# Patient Record
(demographics unavailable — no encounter records)

---

## 2025-06-18 NOTE — HISTORY OF PRESENT ILLNESS
[de-identified] : This is a 65 year old postmenopausal woman diagnosed with breast cancer in sept 2018. She had a hx of prior lumpectomy for calcifications 12 years prior on the left. \par  Abnormal mammogram led to biopsy in right breast  in sept 2018 revealing for IDC, grade 1, er  95% , pr pos 30% her 2 neg\par  Surgery with dr nik ortiz at [place], nov 2018- 1 cm IDC, grade 1, one sentinal node negative. pT1bNo\par  \par  ONcotype was 22. \par  s/p RT until jan 2019\par  \par  started femar initially then aromasin and now on  arimidex with improved symptoms \par  \par  osteopenia in jan 2019 \par  hx of ovary cyst follows with dr house \par  colonoscopy in 2019 \par  also has hx of lyme s/p doxycycline \par  \par  \par  Ultrasound performed on January 23, 2021 revealing for a left parajugular lymph node measuring 1.3 x 1.5 x 0.6 cm no other abnormality seen.\par  Patient now discloses that she had several injections throughout her face and neck with a derma pen on January 6, 2021.\par  Repeat CT of the chest done on January 26, 2021 revealing for no change in the AP window lymph node measuring 2.3 x 1.3cm as well as another lymph measuring 1.7 x 1.3 and another measuring 2.2 x 1.1 precarinal lymph node measuring 1.9 x 0.9.  No change in the left upper lobe nodule measuring 3 mm.\par  \par  \par  mammo sept 2020 \par  \par  + node is now 0.6 x 0.3 cm , done by IR , now better, radiologist made the determination that she will not get biopsy\par   [de-identified] : Here today for follow up of breast cancer  She is feeling well  CNY: March 2024, had 2 polyps removed DEXA: August 2023 Mammo 11/2024-DONG

## 2025-06-18 NOTE — ASSESSMENT
[FreeTextEntry1] : This is a 68 y/o woman with hx of hormone therapy dx with IDC pT1bNo,  ER pos her2 negative , oncotype 22. S/p radiation. On arimidex 11/2018- 3/24  # breast cancer  doing well DONG  markers stable repeat today  Oct 2023 mammo stable  ultrasound of neck stable , exam stable, repeat again in april (due now but given recent covid will push out to april)  repeat ct chest to follow up also  Continue Arimidex, plan for at least 5 yrs  Patient with significant side effects from AI - pain, thinning of the hair, dryness,  Change to tamoxifen 20 mg - August 2022- March 2023, tamoxifen stopped, biopsy negative Feb 2023 Completed Arimidex Feb 2024  # thickened endometrium - s/p D&C benign- Due for TV US with gyn  # osteopenia -1.3 lumbar spine dexa August 2023 - low bone density, due now   cont vit d and exercise   # joint pains - much better since off AI  # vaginal dryness  stable  has hyaluronic acid suppositories  s/p follow up dr house will see PT for pelvic floor PT   # adenopathy Ultrasound of the neck shows a perijugular node stable on exam  stable on ultrasound  s/p ent eval, no bx recommended  ultrasound  May 2022 - DONG  Discussed with patient at length Follow-up in 12 months

## 2025-06-18 NOTE — PHYSICAL EXAM
[de-identified] : Left supraclavicular lymph node stable, small rubbery movable  [de-identified] : no masses or adenopathy bilaterally

## 2025-06-18 NOTE — PHYSICAL EXAM
[de-identified] : Left supraclavicular lymph node stable, small rubbery movable  [de-identified] : no masses or adenopathy bilaterally

## 2025-06-18 NOTE — HISTORY OF PRESENT ILLNESS
[de-identified] : This is a 65 year old postmenopausal woman diagnosed with breast cancer in sept 2018. She had a hx of prior lumpectomy for calcifications 12 years prior on the left. \par  Abnormal mammogram led to biopsy in right breast  in sept 2018 revealing for IDC, grade 1, er  95% , pr pos 30% her 2 neg\par  Surgery with dr nik ortiz at [place], nov 2018- 1 cm IDC, grade 1, one sentinal node negative. pT1bNo\par  \par  ONcotype was 22. \par  s/p RT until jan 2019\par  \par  started femar initially then aromasin and now on  arimidex with improved symptoms \par  \par  osteopenia in jan 2019 \par  hx of ovary cyst follows with dr house \par  colonoscopy in 2019 \par  also has hx of lyme s/p doxycycline \par  \par  \par  Ultrasound performed on January 23, 2021 revealing for a left parajugular lymph node measuring 1.3 x 1.5 x 0.6 cm no other abnormality seen.\par  Patient now discloses that she had several injections throughout her face and neck with a derma pen on January 6, 2021.\par  Repeat CT of the chest done on January 26, 2021 revealing for no change in the AP window lymph node measuring 2.3 x 1.3cm as well as another lymph measuring 1.7 x 1.3 and another measuring 2.2 x 1.1 precarinal lymph node measuring 1.9 x 0.9.  No change in the left upper lobe nodule measuring 3 mm.\par  \par  \par  mammo sept 2020 \par  \par  + node is now 0.6 x 0.3 cm , done by IR , now better, radiologist made the determination that she will not get biopsy\par   [de-identified] : Here today for follow up of breast cancer  She is feeling well  CNY: March 2024, had 2 polyps removed DEXA: August 2023 Mammo 11/2024-DONG